# Patient Record
Sex: FEMALE | Race: BLACK OR AFRICAN AMERICAN | NOT HISPANIC OR LATINO | URBAN - METROPOLITAN AREA
[De-identification: names, ages, dates, MRNs, and addresses within clinical notes are randomized per-mention and may not be internally consistent; named-entity substitution may affect disease eponyms.]

---

## 2018-10-06 ENCOUNTER — EMERGENCY (EMERGENCY)
Facility: HOSPITAL | Age: 28
LOS: 1 days | Discharge: HOME | End: 2018-10-06
Attending: EMERGENCY MEDICINE
Payer: SELF-PAY

## 2018-10-06 VITALS
OXYGEN SATURATION: 100 % | WEIGHT: 192.02 LBS | SYSTOLIC BLOOD PRESSURE: 136 MMHG | DIASTOLIC BLOOD PRESSURE: 89 MMHG | HEIGHT: 67 IN | TEMPERATURE: 98 F | HEART RATE: 86 BPM | RESPIRATION RATE: 20 BRPM

## 2018-10-06 LAB — HCG UR QL: NEGATIVE — SIGNIFICANT CHANGE UP

## 2018-10-06 PROCEDURE — 99283 EMERGENCY DEPT VISIT LOW MDM: CPT

## 2018-10-06 PROCEDURE — 71101 X-RAY EXAM UNILAT RIBS/CHEST: CPT | Mod: 26

## 2018-10-06 PROCEDURE — 81025 URINE PREGNANCY TEST: CPT

## 2018-10-06 PROCEDURE — 71101 X-RAY EXAM UNILAT RIBS/CHEST: CPT

## 2018-10-06 RX ORDER — ACETAMINOPHEN 500 MG
975 TABLET ORAL ONCE
Qty: 0 | Refills: 0 | Status: COMPLETED | OUTPATIENT
Start: 2018-10-06 | End: 2018-10-06

## 2018-10-06 RX ADMIN — Medication 975 MILLIGRAM(S): at 19:51

## 2018-10-06 NOTE — ED STATDOCS - PHYSICAL EXAMINATION
Gen: NAD, AOx3  Head: NCAT  HEENT: Oral mucosa moist, normal conjunctiva, neck supple  Lung: CTAB, no respiratory distress  CV: rrr, no murmur, Normal perfusion  Abd: soft, NTND, no CVA tenderness  MSK: No edema, no visible deformities. No midline spinal tenderness, positive tenderness R lower ribcage.   Neuro: No focal neurologic deficits  Skin: No rash, no seatbelt sign  Psych: normal affect
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
## 2018-10-06 NOTE — ED STATDOCS - PROGRESS NOTE DETAILS
Pt moved form intake Room. Pt seen and evaluated by intake Physician. HPI, Physical examination performed by intake Physician . Note reviewed and followup examination performed by me consistent with initial assessment. Agrees with intake Physician plan and tests. Pt examined and X-ray negative on ED review. Pt made aware of X-ray results. Pt Urine pregnancy negative .
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
## 2018-10-06 NOTE — ED STATDOCS - ATTENDING CONTRIBUTION TO CARE
I, Brenda Al, performed the initial face to face bedside interview with this patient regarding history of present illness, review of symptoms and relevant past medical, social and family history.  I completed an independent physical examination.  I was the initial provider who evaluated this patient.   The history, relevant review of systems, past medical and surgical history, medical decision making, and physical examination was documented by the scribe in my presence and I attest to the accuracy of the documentation.     Follow-up on ordered tests (ie labs, radiologic studies) and re-evaluation of the patient's status has been communicated to the ACP.  Disposition of the patient will be based on test outcome and response to ED interventions.
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
## 2021-09-15 ENCOUNTER — EMERGENCY (EMERGENCY)
Facility: HOSPITAL | Age: 31
LOS: 0 days | Discharge: HOME | End: 2021-09-15
Attending: STUDENT IN AN ORGANIZED HEALTH CARE EDUCATION/TRAINING PROGRAM
Payer: COMMERCIAL

## 2021-09-15 VITALS
HEART RATE: 94 BPM | TEMPERATURE: 99 F | HEIGHT: 67 IN | SYSTOLIC BLOOD PRESSURE: 135 MMHG | RESPIRATION RATE: 16 BRPM | WEIGHT: 212.97 LBS | DIASTOLIC BLOOD PRESSURE: 101 MMHG | OXYGEN SATURATION: 100 %

## 2021-09-15 DIAGNOSIS — R10.30 LOWER ABDOMINAL PAIN, UNSPECIFIED: ICD-10-CM

## 2021-09-15 DIAGNOSIS — N89.8 OTHER SPECIFIED NONINFLAMMATORY DISORDERS OF VAGINA: ICD-10-CM

## 2021-09-15 LAB
APPEARANCE UR: CLEAR — SIGNIFICANT CHANGE UP
BACTERIA # UR AUTO: ABNORMAL
BILIRUB UR-MCNC: NEGATIVE — SIGNIFICANT CHANGE UP
COLOR SPEC: YELLOW — SIGNIFICANT CHANGE UP
DIFF PNL FLD: NEGATIVE — SIGNIFICANT CHANGE UP
EPI CELLS # UR: SIGNIFICANT CHANGE UP
GLUCOSE UR QL: NEGATIVE MG/DL — SIGNIFICANT CHANGE UP
HCG UR QL: NEGATIVE — SIGNIFICANT CHANGE UP
KETONES UR-MCNC: NEGATIVE — SIGNIFICANT CHANGE UP
LEUKOCYTE ESTERASE UR-ACNC: ABNORMAL
NITRITE UR-MCNC: NEGATIVE — SIGNIFICANT CHANGE UP
PH UR: 5 — SIGNIFICANT CHANGE UP (ref 5–8)
PROT UR-MCNC: NEGATIVE MG/DL — SIGNIFICANT CHANGE UP
RBC CASTS # UR COMP ASSIST: SIGNIFICANT CHANGE UP /HPF (ref 0–4)
SP GR SPEC: 1.02 — SIGNIFICANT CHANGE UP (ref 1.01–1.02)
UROBILINOGEN FLD QL: NEGATIVE MG/DL — SIGNIFICANT CHANGE UP
WBC UR QL: SIGNIFICANT CHANGE UP

## 2021-09-15 PROCEDURE — 99284 EMERGENCY DEPT VISIT MOD MDM: CPT

## 2021-09-15 RX ORDER — ACETAMINOPHEN 500 MG
650 TABLET ORAL ONCE
Refills: 0 | Status: COMPLETED | OUTPATIENT
Start: 2021-09-15 | End: 2021-09-15

## 2021-09-15 RX ADMIN — Medication 650 MILLIGRAM(S): at 12:48

## 2021-09-15 NOTE — ED PROVIDER NOTE - CLINICAL SUMMARY MEDICAL DECISION MAKING FREE TEXT BOX
SHERIF Santiago, Attending: note by attg. History and physical as above.    No concern for ovarian torsion. Will eval for preg, cystitis. Given no discharge seen and no signs of cervicitis will offer pt treatment vs await urine G/C results. No concern for acute abd pathology. No tenderness of abd/pelvic exam. Looks well, no distress.

## 2021-09-15 NOTE — ED ADULT NURSE NOTE - OBJECTIVE STATEMENT
AAOx4, resps even and unlabored, skin warm and dry. Pt c/o bilateral lower abdominal pain x1 week, sts she had positive preg at home, denies vaginal bleeding. Rates pain 8/10, denies dysuria but reports vaginal itching at times.

## 2021-09-15 NOTE — ED PROVIDER NOTE - PROGRESS NOTE DETAILS
SHERIF Santiago, Attending: not pregnant. Plan to follow urine cx, no treatment indicated. Pt offered tx for STDs, elects to await urine testing. Advised to call ER tomorrow if she does not hear from the call back team. Stable for d/c.

## 2021-09-15 NOTE — ED PROVIDER NOTE - OBJECTIVE STATEMENT
30 yoF,  p/w with 1 week of low abd pain and vaginal discharge. No bleeding. Does not think she is pregnant. LNMP April but often has long periods without menstruation. No f/c, NVD, or bowel/urinary changes. Tolerating PO without issue. Sexually active, with intermittent condom use. Tolerating PO. Pain is suprapubic region. Mild, constant. Denies smoking, drinking, drug use.

## 2021-09-15 NOTE — ED PROVIDER NOTE - PATIENT PORTAL LINK FT
You can access the FollowMyHealth Patient Portal offered by [facility] by registering at the following website: [email or web address]. By joining [facility]’s FollowMyHealth portal, you will also be able to view your health information using other applications (apps) compatible with our system.

## 2021-09-15 NOTE — ED PROVIDER NOTE - PHYSICAL EXAMINATION
General: alert, conversant, looks well, vitals reassuring, no distress, on cellular phone   Head: atraumatic, normocephalic  Eyes: PERRL, EOMI, no scleral icterus  ENT: no epistaxis, normal phonation, airway patent  Neck: full ROM, no midline ttp  CV: RRR, no murmurs, HDS  Pulm: lungs CTA b/l, no wheezing, no respiratory distress  GI: abd soft, non tender, no guarding/rebound/masses PELVIC: Rebeka MCCRACKEN RN chaperone, external exam wnl. No bleeding. No discharge seen. Cervix appears healthy. No trauma, lesions, or lacerations. No ttp or discomfort on spec/bimanual exam.   Back: normal ROM, no midline ttp, no signs of trauma  Extremities: normal ROM, joints stable, distal pulses intact, no edema  Neuro: alert, oriented x3, moving all extremities, interactive, normal speech/memory/gait   Derm: warm, dry, normal color, no rash/wounds

## 2021-09-15 NOTE — ED PROVIDER NOTE - NSFOLLOWUPINSTRUCTIONS_ED_ALL_ED_FT
Your testing was reassuring.    Your are not pregnant.     Please call the ER tomorrow if you do not hear from someone about the result of your other test.    Follow up with your doctor or OBGYN for other concerns.    Please return to ER for new or concerning symptoms.

## 2021-09-16 LAB
C TRACH RRNA SPEC QL NAA+PROBE: SIGNIFICANT CHANGE UP
CULTURE RESULTS: SIGNIFICANT CHANGE UP
N GONORRHOEA RRNA SPEC QL NAA+PROBE: SIGNIFICANT CHANGE UP
SPECIMEN SOURCE: SIGNIFICANT CHANGE UP
SPECIMEN SOURCE: SIGNIFICANT CHANGE UP

## 2024-09-04 ENCOUNTER — APPOINTMENT (OUTPATIENT)
Dept: OBGYN | Facility: CLINIC | Age: 34
End: 2024-09-04
Payer: COMMERCIAL

## 2024-09-04 VITALS
HEART RATE: 72 BPM | SYSTOLIC BLOOD PRESSURE: 159 MMHG | WEIGHT: 201 LBS | DIASTOLIC BLOOD PRESSURE: 116 MMHG | HEIGHT: 67 IN | BODY MASS INDEX: 31.55 KG/M2

## 2024-09-04 DIAGNOSIS — Z01.419 ENCOUNTER FOR GYNECOLOGICAL EXAMINATION (GENERAL) (ROUTINE) W/OUT ABNORMAL FINDINGS: ICD-10-CM

## 2024-09-04 DIAGNOSIS — N91.1 SECONDARY AMENORRHEA: ICD-10-CM

## 2024-09-04 PROBLEM — Z00.00 ENCOUNTER FOR PREVENTIVE HEALTH EXAMINATION: Status: ACTIVE | Noted: 2024-09-04

## 2024-09-04 PROCEDURE — 99385 PREV VISIT NEW AGE 18-39: CPT

## 2024-09-04 PROCEDURE — 99214 OFFICE O/P EST MOD 30 MIN: CPT | Mod: 25

## 2024-09-04 PROCEDURE — 99459 PELVIC EXAMINATION: CPT

## 2024-09-04 NOTE — HISTORY OF PRESENT ILLNESS
[FreeTextEntry1] : 33-year-old para 1 -American female presents as a new patient.  She is here for well visit as well as with complaint of amenorrhea for almost 6 years.  She reports she had seen a physician who gave her medication and after stopping the medication she did bleed.  She denies any pelvic pain.  She has no significant medical history.  Surgical history of laparotomy with left salpingo-oophorectomy although patient does not have the particulars.  OB history significant for 1 vaginal delivery.  She has a 7-year-old daughter with significant developmental disabilities who is nonverbal.    She is .  She denies alcohol or drug use but smokes cigarettes.  She denies any family history of breast, ovarian, colon, uterine CA.

## 2024-09-04 NOTE — DISCUSSION/SUMMARY
[FreeTextEntry1] : Pap smear is performed.  Regarding her amenorrhea had a long discussion.  I discussed etiology and management including premature menopause versus anovulatory dysfunction versus Asherman syndrome.  Less likely to have Asherman's because patient does report.  After taking medication.  I am sending him for some blood work checking TSH FSH LH serum anti-mullerian hormone level and serum prolactin level.  She will return for sonogram.  Additionally patient will try to get the operative note for her laparotomy.  All questions were answered.

## 2024-09-04 NOTE — PHYSICAL EXAM
[Chaperone Present] : A chaperone was present in the examining room during all aspects of the physical examination [[id number]] : A chaperone was present during the pelvic exam. [FreeTextEntry2] : leo michelle [Appropriately responsive] : appropriately responsive [Alert] : alert [No Acute Distress] : no acute distress [No Lymphadenopathy] : no lymphadenopathy [Regular Rate Rhythm] : regular rate rhythm [No Murmurs] : no murmurs [Clear to Auscultation B/L] : clear to auscultation bilaterally [Soft] : soft [Non-tender] : non-tender [Non-distended] : non-distended [No HSM] : No HSM [No Lesions] : no lesions [No Mass] : no mass [Oriented x3] : oriented x3 [Examination Of The Breasts] : a normal appearance [No Masses] : no breast masses were palpable [Labia Majora] : normal [Labia Minora] : normal [Normal] : normal [Uterine Adnexae] : normal

## 2024-09-08 LAB — HPV HIGH+LOW RISK DNA PNL CVX: NOT DETECTED

## 2024-09-09 LAB
A VAGINAE DNA VAG QL NAA+PROBE: NORMAL
BVAB2 DNA VAG QL NAA+PROBE: NORMAL
C KRUSEI DNA VAG QL NAA+PROBE: NEGATIVE
C TRACH RRNA SPEC QL NAA+PROBE: NEGATIVE
CANDIDA DNA VAG QL NAA+PROBE: NEGATIVE
CYTOLOGY CVX/VAG DOC THIN PREP: ABNORMAL
MEGA1 DNA VAG QL NAA+PROBE: ABNORMAL
N GONORRHOEA RRNA SPEC QL NAA+PROBE: NEGATIVE
T VAGINALIS RRNA SPEC QL NAA+PROBE: NEGATIVE

## 2024-10-02 ENCOUNTER — APPOINTMENT (OUTPATIENT)
Dept: OBGYN | Facility: CLINIC | Age: 34
End: 2024-10-02

## 2024-10-02 ENCOUNTER — APPOINTMENT (OUTPATIENT)
Dept: ANTEPARTUM | Facility: CLINIC | Age: 34
End: 2024-10-02